# Patient Record
Sex: MALE | URBAN - METROPOLITAN AREA
[De-identification: names, ages, dates, MRNs, and addresses within clinical notes are randomized per-mention and may not be internally consistent; named-entity substitution may affect disease eponyms.]

---

## 2019-11-25 ENCOUNTER — OFFICE VISIT (OUTPATIENT)
Dept: URBAN - METROPOLITAN AREA CLINIC 62 | Facility: CLINIC | Age: 49
End: 2019-11-25
Payer: OTHER GOVERNMENT

## 2019-11-25 PROCEDURE — V2627 SCLERAL COVER SHELL: HCPCS | Performed by: OPTOMETRIST

## 2019-11-25 PROCEDURE — 99213 OFFICE O/P EST LOW 20 MIN: CPT | Performed by: OPTOMETRIST

## 2019-11-25 ASSESSMENT — INTRAOCULAR PRESSURE: OS: 15

## 2020-03-02 ENCOUNTER — OFFICE VISIT (OUTPATIENT)
Dept: URBAN - METROPOLITAN AREA CLINIC 62 | Facility: CLINIC | Age: 50
End: 2020-03-02
Payer: OTHER GOVERNMENT

## 2020-03-02 PROCEDURE — V2626 REDUCTION OF EYE PROSTHESIS: HCPCS | Performed by: OPTOMETRIST

## 2020-03-02 PROCEDURE — 99213 OFFICE O/P EST LOW 20 MIN: CPT | Performed by: OPTOMETRIST

## 2020-03-02 RX ORDER — LOTEPREDNOL ETABONATE AND TOBRAMYCIN 5; 3 MG/ML; MG/ML
SUSPENSION/ DROPS OPHTHALMIC
Qty: 5 | Refills: 6 | Status: ACTIVE
Start: 2020-03-02

## 2020-03-02 ASSESSMENT — INTRAOCULAR PRESSURE: OS: 15

## 2020-03-02 NOTE — IMPRESSION/PLAN
Impression: Other anophthalmos: Q11.1. Plan: Irritation and Discharge due to build up on the prosthesis and mechanical irritation. Cleaned and polished prosthesis today. Use Artificial Tears PRN. [place] BID OU PRN. Return to Clinic in 3 months for PRFU and further evaluation and management.  reduced backside RTC 3months PRFU

## 2020-08-17 ENCOUNTER — OFFICE VISIT (OUTPATIENT)
Dept: URBAN - METROPOLITAN AREA CLINIC 62 | Facility: CLINIC | Age: 50
End: 2020-08-17
Payer: OTHER GOVERNMENT

## 2020-08-17 PROCEDURE — V2624 POLISHING ARTIFICAL EYE: HCPCS | Performed by: OPTOMETRIST

## 2020-08-17 PROCEDURE — 99213 OFFICE O/P EST LOW 20 MIN: CPT | Performed by: OPTOMETRIST

## 2020-08-17 RX ORDER — TOBRAMYCIN 3 MG/ML
0.3 % SOLUTION/ DROPS OPHTHALMIC
Qty: 5 | Refills: 0 | Status: ACTIVE
Start: 2020-08-17

## 2020-08-17 RX ORDER — PREDNISOLONE ACETATE 10 MG/ML
1 % SUSPENSION/ DROPS OPHTHALMIC
Qty: 15 | Refills: 1 | Status: INACTIVE
Start: 2020-08-17 | End: 2020-09-15

## 2020-08-17 ASSESSMENT — INTRAOCULAR PRESSURE: OS: 12

## 2021-01-01 NOTE — IMPRESSION/PLAN
Impression: Other anophthalmos: Q11.1. Plan: Patient needs a prosthetic eye due to the shrinking socket and atrophy of lids and surrounding tissue. Recommend a scleral shell with the possibility of socket and lid expansion with progressively larger prosthetic eyes. disp shell
immune

## 2021-01-04 ENCOUNTER — OFFICE VISIT (OUTPATIENT)
Dept: URBAN - METROPOLITAN AREA CLINIC 62 | Facility: CLINIC | Age: 51
End: 2021-01-04
Payer: OTHER GOVERNMENT

## 2021-01-04 DIAGNOSIS — H04.123 DRY EYE SYNDROME: ICD-10-CM

## 2021-01-04 DIAGNOSIS — Q11.1 OTHER ANOPHTHALMOS: Primary | ICD-10-CM

## 2021-01-04 DIAGNOSIS — H11.31 CONJUNCTIVAL HEMORRHAGE, RIGHT EYE: ICD-10-CM

## 2021-01-04 PROCEDURE — 99213 OFFICE O/P EST LOW 20 MIN: CPT | Performed by: OPTOMETRIST

## 2021-01-04 PROCEDURE — V2624 POLISHING ARTIFICAL EYE: HCPCS | Performed by: OPTOMETRIST

## 2021-01-04 ASSESSMENT — INTRAOCULAR PRESSURE: OS: 10

## 2021-01-04 NOTE — IMPRESSION/PLAN
Impression: Dry Eye Syndrome: R55.390. Plan: Dry eyes account for the patient's complaints. There is no evidence of permanent changes to the cornea. Explained condition does not have a cure and will need artificial tears for maintenance.

## 2021-05-24 ENCOUNTER — OFFICE VISIT (OUTPATIENT)
Dept: URBAN - METROPOLITAN AREA CLINIC 63 | Facility: CLINIC | Age: 51
End: 2021-05-24
Payer: OTHER GOVERNMENT

## 2021-05-24 PROCEDURE — V2628 FABRICATION & FITTING: HCPCS | Performed by: OPTOMETRIST

## 2021-05-24 PROCEDURE — 92012 INTRM OPH EXAM EST PATIENT: CPT | Performed by: OPTOMETRIST

## 2021-05-24 ASSESSMENT — INTRAOCULAR PRESSURE: OS: 10

## 2021-05-24 NOTE — IMPRESSION/PLAN
Impression: Other anophthalmos: Q11.1. Plan: Pt needs new prosthesis due to age and condition of current one, mod conformer today, needs shell 11.75 3.0 1B(see photo) 3030(Y with major vessels RTC 2 months PRDS Patient needs a prosthetic eye due to the shrinking socket and atrophy of lids and surrounding tissue. Recommend a scleral shell with the possibility of socket and lid expansion with progressively larger prosthetic eyes.

## 2021-07-19 ENCOUNTER — OFFICE VISIT (OUTPATIENT)
Dept: URBAN - METROPOLITAN AREA CLINIC 63 | Facility: CLINIC | Age: 51
End: 2021-07-19
Payer: OTHER GOVERNMENT

## 2021-07-19 PROCEDURE — 92012 INTRM OPH EXAM EST PATIENT: CPT | Performed by: OPTOMETRIST

## 2021-07-19 PROCEDURE — V2627 SCLERAL COVER SHELL: HCPCS | Performed by: OPTOMETRIST

## 2021-07-19 ASSESSMENT — INTRAOCULAR PRESSURE: OS: 11

## 2021-07-19 NOTE — IMPRESSION/PLAN
Impression: Other anophthalmos: Q11.1. Plan: Patient needs a prosthetic eye due to the shrinking socket and atrophy of lids and surrounding tissue. Recommend a scleral shell with the possibility of socket and lid expansion with progressively larger prosthetic eyes.  disp shell

## 2024-02-13 ENCOUNTER — OFFICE VISIT (OUTPATIENT)
Dept: URBAN - METROPOLITAN AREA CLINIC 63 | Facility: CLINIC | Age: 54
End: 2024-02-13
Payer: OTHER GOVERNMENT

## 2024-02-13 DIAGNOSIS — H40.1122 PRIMARY OPEN-ANGLE GLAUCOMA, LEFT EYE, MODERATE STAGE: ICD-10-CM

## 2024-02-13 DIAGNOSIS — E11.9 TYPE 2 DIABETES MELLITUS W/O COMPLICATION: Primary | ICD-10-CM

## 2024-02-13 DIAGNOSIS — H04.123 DRY EYE SYNDROME OF BILATERAL LACRIMAL GLANDS: ICD-10-CM

## 2024-02-13 DIAGNOSIS — Z96.1 PRESENCE OF INTRAOCULAR LENS: ICD-10-CM

## 2024-02-13 DIAGNOSIS — Q11.1 OTHER ANOPHTHALMOS: ICD-10-CM

## 2024-02-13 PROCEDURE — 92014 COMPRE OPH EXAM EST PT 1/>: CPT | Performed by: OPTOMETRIST

## 2024-02-13 PROCEDURE — 92133 CPTRZD OPH DX IMG PST SGM ON: CPT | Performed by: OPTOMETRIST

## 2024-02-13 RX ORDER — LATANOPROST 50 UG/ML
0.005 % SOLUTION OPHTHALMIC
Qty: 5 | Refills: 5 | Status: ACTIVE
Start: 2024-02-13

## 2024-02-13 ASSESSMENT — INTRAOCULAR PRESSURE: OS: 17

## 2024-03-12 ENCOUNTER — OFFICE VISIT (OUTPATIENT)
Dept: URBAN - METROPOLITAN AREA CLINIC 63 | Facility: CLINIC | Age: 54
End: 2024-03-12
Payer: OTHER GOVERNMENT

## 2024-03-12 DIAGNOSIS — H40.1122 PRIMARY OPEN-ANGLE GLAUCOMA, LEFT EYE, MODERATE STAGE: Primary | ICD-10-CM

## 2024-03-12 PROCEDURE — 99213 OFFICE O/P EST LOW 20 MIN: CPT | Performed by: OPTOMETRIST

## 2024-03-12 ASSESSMENT — INTRAOCULAR PRESSURE: OS: 13

## 2024-04-22 ENCOUNTER — OFFICE VISIT (OUTPATIENT)
Dept: URBAN - METROPOLITAN AREA CLINIC 63 | Facility: CLINIC | Age: 54
End: 2024-04-22
Payer: OTHER GOVERNMENT

## 2024-04-22 DIAGNOSIS — H40.1122 PRIMARY OPEN-ANGLE GLAUCOMA, LEFT EYE, MODERATE STAGE: ICD-10-CM

## 2024-04-22 DIAGNOSIS — Q11.1 OTHER ANOPHTHALMOS: Primary | ICD-10-CM

## 2024-04-22 PROCEDURE — 92012 INTRM OPH EXAM EST PATIENT: CPT | Performed by: OPTOMETRIST

## 2024-04-22 PROCEDURE — V2628 FABRICATION & FITTING: HCPCS | Performed by: OPTOMETRIST

## 2024-04-22 RX ORDER — PREDNISOLONE ACETATE 10 MG/ML
1 % SUSPENSION/ DROPS OPHTHALMIC
Qty: 5 | Refills: 1 | Status: ACTIVE
Start: 2024-04-22

## 2024-04-22 RX ORDER — CIPROFLOXACIN HYDROCHLORIDE 3 MG/ML
0.3 % SOLUTION OPHTHALMIC
Qty: 5 | Refills: 7 | Status: INACTIVE
Start: 2024-04-22 | End: 2024-05-21

## 2024-04-22 ASSESSMENT — INTRAOCULAR PRESSURE: OS: 12

## 2024-05-20 ENCOUNTER — OFFICE VISIT (OUTPATIENT)
Dept: URBAN - METROPOLITAN AREA CLINIC 63 | Facility: CLINIC | Age: 54
End: 2024-05-20
Payer: OTHER GOVERNMENT

## 2024-05-20 DIAGNOSIS — Q11.1 OTHER ANOPHTHALMOS: Primary | ICD-10-CM

## 2024-05-20 PROCEDURE — V2627 SCLERAL COVER SHELL: HCPCS | Performed by: OPTOMETRIST

## 2024-05-20 PROCEDURE — 92012 INTRM OPH EXAM EST PATIENT: CPT | Performed by: OPTOMETRIST

## 2024-05-20 ASSESSMENT — INTRAOCULAR PRESSURE: OS: 12

## 2024-09-09 ENCOUNTER — OFFICE VISIT (OUTPATIENT)
Dept: URBAN - METROPOLITAN AREA CLINIC 63 | Facility: CLINIC | Age: 54
End: 2024-09-09
Payer: OTHER GOVERNMENT

## 2024-09-09 DIAGNOSIS — Q11.1 OTHER ANOPHTHALMOS: Primary | ICD-10-CM

## 2024-09-09 PROCEDURE — V2624 POLISHING ARTIFICAL EYE: HCPCS | Performed by: OPTOMETRIST

## 2024-09-09 PROCEDURE — 99213 OFFICE O/P EST LOW 20 MIN: CPT | Performed by: OPTOMETRIST

## 2024-09-09 RX ORDER — TOBRAMYCIN 3 MG/G
0.3 % OINTMENT OPHTHALMIC
Qty: 15 | Refills: 6 | Status: INACTIVE
Start: 2024-09-09 | End: 2024-09-10

## 2024-09-09 RX ORDER — PREDNISOLONE ACETATE 10 MG/ML
1 % SUSPENSION/ DROPS OPHTHALMIC
Qty: 5 | Refills: 1 | Status: ACTIVE
Start: 2024-09-09

## 2024-09-09 RX ORDER — TOBRAMYCIN 3 MG/G
0.3 % OINTMENT OPHTHALMIC
Qty: 0 | Refills: 0 | Status: INACTIVE
Start: 2024-09-09 | End: 2024-09-09

## 2024-09-09 ASSESSMENT — INTRAOCULAR PRESSURE: OS: 14

## 2025-02-10 ENCOUNTER — OFFICE VISIT (OUTPATIENT)
Dept: URBAN - METROPOLITAN AREA CLINIC 63 | Facility: CLINIC | Age: 55
End: 2025-02-10
Payer: OTHER GOVERNMENT

## 2025-02-10 DIAGNOSIS — Q11.1 OTHER ANOPHTHALMOS: Primary | ICD-10-CM

## 2025-02-10 DIAGNOSIS — H40.1122 PRIMARY OPEN-ANGLE GLAUCOMA, LEFT EYE, MODERATE STAGE: ICD-10-CM

## 2025-02-10 PROCEDURE — 92012 INTRM OPH EXAM EST PATIENT: CPT | Performed by: OPTOMETRIST

## 2025-02-10 PROCEDURE — V2624 POLISHING ARTIFICAL EYE: HCPCS | Performed by: OPTOMETRIST

## 2025-02-10 ASSESSMENT — INTRAOCULAR PRESSURE: OS: 12
